# Patient Record
Sex: FEMALE | Race: WHITE
[De-identification: names, ages, dates, MRNs, and addresses within clinical notes are randomized per-mention and may not be internally consistent; named-entity substitution may affect disease eponyms.]

---

## 2017-08-03 NOTE — EDM.PDOC
ED HPI GENERAL MEDICAL PROBLEM





- General


Chief Complaint: Abdominal Pain


Stated Complaint: LLQ pain


Time Seen by Provider: 08/03/17 20:45


Source of Information: Reports: Patient


History Limitations: Reports: No Limitations





- History of Present Illness


INITIAL COMMENTS - FREE TEXT/NARRATIVE: 





states that starting yesterday she started to have left lower quadrant 

abdominal pain.  Has had multiple diarrhea stools since then and had last 

diarrhea about 2 hours ago.  No appetite.  No vomiting.  Has had fever and 

chills throughout the day. Pain is usually sharp and stabbing at about 8/10.  

No pain to the other area of abdomen.  Has history of diverticulitis.  Last 

episode was about 10-15 years ago where she ended up in the hospital for 

something similar.


Onset: Gradual


Duration: Day(s): (2)


Location: Reports: Abdomen


Quality: Reports: Sharp, Stabbing


Severity: Severe


Associated Symptoms: Reports: Fever/Chills, Loss of Appetite


  ** Left Lower Abdomen


Pain Score (Numeric/FACES): 8





- Related Data


 Allergies











Allergy/AdvReac Type Severity Reaction Status Date / Time


 


cortisone Allergy  Abdominal Verified 08/03/17 20:45





   Pain  


 


Penicillins Allergy  Hives Verified 08/03/17 20:45











Home Meds: 


 Home Meds





Cortisol 5 - 10 mg PO BID 08/03/17 [History]


T3 5 - 15 mcg PO DAILY 08/03/17 [History]


T4 75 mcg PO DAILY 08/03/17 [History]











Past Medical History


Gastrointestinal History: Reports: Diverticulosis





- Past Surgical History


HEENT Surgical History: Reports: Cataract Surgery, Tonsillectomy


Female  Surgical History: Reports: Tubal Ligation, Other (See Below) (

lumpectomy)





Social & Family History





- Tobacco Use


Smoking Status *Q: Never Smoker





- Living Situation & Occupation


Living situation: Reports: , with Family


Occupation: Retired





ED ROS GENERAL





- Review of Systems


Review Of Systems: See Below


Constitutional: Reports: Fever, Chills, Decreased Appetite


HEENT: Reports: No Symptoms


Respiratory: Reports: No Symptoms


Cardiovascular: Reports: No Symptoms


GI/Abdominal: Reports: Abdominal Pain, Anorexia, Diarrhea, Nausea.  Denies: 

Hematochezia, Vomiting


: Reports: No Symptoms


Musculoskeletal: Reports: Other ("rotated" her pelvis 2 weeks ago so has been 

having some hip pain.)


Skin: Reports: No Symptoms


Neurological: Reports: No Symptoms


Psychiatric: Reports: No Symptoms





ED EXAM, GI/ABD





- Physical Exam


Exam: See Below


Exam Limited By: No Limitations


General Appearance: Alert, WD/WN, Moderate Distress


Ears: Normal External Exam, Normal Canal, Normal TMs


Nose: Normal Inspection


Throat/Mouth: Normal Inspection, Normal Oropharynx, No Airway Compromise


Head: Atraumatic, Normocephalic


Neck: Normal Inspection, Supple, Non-Tender, Full Range of Motion


Respiratory/Chest: No Respiratory Distress, Lungs Clear, Normal Breath Sounds


Cardiovascular: Normal Peripheral Pulses, Regular Rate, Rhythm, No Edema


GI/Abdominal Exam: Normal Bowel Sounds, Soft, No Distention, Guarding (to left 

lower quadrant.), Tender (to the left lower quadrant.)


Back Exam: Normal Inspection


Extremities: Normal Inspection, Normal Range of Motion, Non-Tender


Neurological: Alert, Oriented


Skin Exam: Warm, Dry, Intact





Course





- Vital Signs


Last Recorded V/S: 


 Last Vital Signs











Temp  100.0 F   08/03/17 20:45


 


Pulse  66   08/03/17 20:45


 


Resp  16   08/03/17 20:45


 


BP  123/69   08/03/17 20:45


 


Pulse Ox  100   08/03/17 20:45














- Orders/Labs/Meds


Orders: 


 Active Orders 24 hr











 Category Date Time Status


 


 Abdomen 2V AP Flat Upright [CR] Stat Exams  08/03/17 20:46 Taken











Labs: 


 Laboratory Tests











  08/03/17 08/03/17 08/03/17 Range/Units





  20:50 20:50 20:55 


 


WBC  13.4 H    (5.0-10.0)  10^3/uL


 


RBC  4.02    (4.00-5.50)  10^6/uL


 


Hgb  13.0    (12.0-16.0)  g/dL


 


Hct  37.9    (37.0-47.0)  %


 


MCV  94.3 H    (82.0-94.0)  fL


 


MCH  32.3 H    (27.0-32.0)  pg


 


MCHC  34.3    (33.0-38.0)  g/dL


 


RDW Coeff of Higinio  11.8    (11.0-15.0)  %


 


Plt Count  229    (150-400)  10^3/uL


 


Neut % (Auto)  83.8    (35-85)  %


 


Lymph % (Auto)  8.4 L    (10-55)  %


 


Mono % (Auto)  7.6    (0-16)  %


 


Eos % (Auto)  0.1    (0-5)  %


 


Baso % (Auto)  0.1    (0-3)  %


 


Neut # (Auto)  11.25 H    (1.80-7.00)  10^3/uL


 


Lymph # (Auto)  1.12    (1.00-4.80)  10^3/uL


 


Mono # (Auto)  1.02 H    (0.00-0.80)  10^3/uL


 


Eos # (Auto)  0.01    (0.00-0.45)  10^3/uL


 


Baso # (Auto)  0.01    10^3/uL


 


Sodium   137   (136-145)  mEq/L


 


Potassium   3.8   (3.5-5.0)  mEq/L


 


Chloride   100   ()  mEq/L


 


Carbon Dioxide   26   (21-32)  mmol/L


 


BUN   10   (7-18)  mg/dL


 


Creatinine   0.7   (0.6-1.0)  mg/dL


 


Est Cr Clr Drug Dosing   64.99   mL/min


 


Estimated GFR (MDRD)   > 60   (>=60)  mL/min


 


Glucose   102 H   (75-99)  mg/dL


 


Calcium   9.6   (8.4-10.1)  mg/dL


 


C-Reactive Protein   3.9 H   (0.2-0.8)  mg/dL


 


Urine Color    Yellow  (YELLOW)  


 


Urine Appearance    Clear  (CLEAR)  


 


Urine pH    8.0  (4.5-8.0)  


 


Ur Specific Gravity    1.012  (1.003-1.020)  


 


Urine Protein    Negative  (NEGATIVE)  mg/dL


 


Urine Glucose (UA)    Negative  (NEGATIVE)  mg/dL


 


Urine Ketones    40 H  (NEGATIVE)  mg/dL


 


Urine Occult Blood    Moderate H  (NEGATIVE)  


 


Urine Nitrite    Negative  (NEGATIVE)  


 


Urine Bilirubin    Negative  (NEGATIVE)  


 


Urine Urobilinogen    0.2  (0.2-1.0)  EU/dL


 


Ur Leukocyte Esterase    Trace H  (NEGATIVE)  


 


Urine RBC    5-10 H  (0-5)  /HPF


 


Urine WBC    0-5  (0-5)  /HPF


 


Amorphous Sediment    Few H  (NOT SEEN)  /HPF














- Re-Assessments/Exams


Free Text/Narrative Re-Assessment/Exam: 





08/03/17 21:23


In to discuss lab results.  Will admit for IV fluids and antibiotics.





Departure





- Departure


Time of Disposition: 21:24


Disposition: Admitted As Inpatient 66


Condition: Good


Clinical Impression: 


 Diverticulitis large intestine








- Discharge Information





- Problem List & Annotations


(1) Diverticulitis large intestine


SNOMED Code(s): 3094438


   Code(s): K57.32 - DVTRCLI OF LG INT W/O PERFORATION OR ABSCESS W/O BLEEDING 

  Status: Acute   Priority: High   Current Visit: Yes   


Qualifiers: 


   Diverticulitis bleeding: without bleeding   Diverticulitis complication: 

without perforation or abscess   Qualified Code(s): K57.32 - Diverticulitis of 

large intestine without perforation or abscess without bleeding   





- My Orders


Last 24 Hours: 


My Active Orders





08/03/17 20:46


Abdomen 2V AP Flat Upright [CR] Stat 














- Assessment/Plan


Admission H&P: Please use this note as an admission H&P


Last 24 Hours: 


My Active Orders





08/03/17 20:46


Abdomen 2V AP Flat Upright [CR] Stat 











Plan: 





will admit to acute care to Dr. Dahl with IV fluids and antibiotics.  Repeat 

labs in the AM.

## 2017-08-04 NOTE — PCM.PN
- General Info


Date of Service: 08/04/17


Functional Status: Reports: Pain Controlled, Tolerating Diet, Ambulating





- Review of Systems


General: Denies: Fever, Weakness, Fatigue


HEENT: Reports: No Symptoms


Pulmonary: Reports: No Symptoms


Cardiovascular: Reports: No Symptoms


Gastrointestinal: Reports: Abdominal Pain, Diarrhea.  Denies: Nausea, Vomiting


Genitourinary: Reports: No Symptoms


Musculoskeletal: Reports: No Symptoms


Skin: Reports: No Symptoms


Neurological: Reports: No Symptoms





- Patient Data


Vitals - Most Recent: 


 Last Vital Signs











Temp  98.8 F   08/04/17 20:00


 


Pulse  64   08/04/17 20:00


 


Resp  16   08/04/17 20:00


 


BP  111/57 L  08/04/17 20:00


 


Pulse Ox  99   08/04/17 20:00











Weight - Most Recent: 119 lb 12.8 oz


I&O - Last 24 Hours: 


 Intake & Output











 08/04/17 08/04/17 08/04/17





 06:59 14:59 22:59


 


Intake Total 250 1445 1200


 


Output Total 400 900 850


 


Balance -150 545 350











Lab Results Last 24 Hours: 


 Laboratory Results - last 24 hr











  08/04/17 08/04/17 Range/Units





  07:00 07:00 


 


WBC  13.1 H   (5.0-10.0)  10^3/uL


 


RBC  3.72 L   (4.00-5.50)  10^6/uL


 


Hgb  11.7 L   (12.0-16.0)  g/dL


 


Hct  35.1 L   (37.0-47.0)  %


 


MCV  94.4 H   (82.0-94.0)  fL


 


MCH  31.5   (27.0-32.0)  pg


 


MCHC  33.3   (33.0-38.0)  g/dL


 


RDW Coeff of Higinio  11.7   (11.0-15.0)  %


 


Plt Count  220   (150-400)  10^3/uL


 


Neut % (Auto)  84.9   (35-85)  %


 


Lymph % (Auto)  6.1 L   (10-55)  %


 


Mono % (Auto)  8.9   (0-16)  %


 


Eos % (Auto)  0   (0-5)  %


 


Baso % (Auto)  0.1   (0-3)  %


 


Neut # (Auto)  11.10 H   (1.80-7.00)  10^3/uL


 


Lymph # (Auto)  0.80 L   (1.00-4.80)  10^3/uL


 


Mono # (Auto)  1.17 H   (0.00-0.80)  10^3/uL


 


Eos # (Auto)  0.00   (0.00-0.45)  10^3/uL


 


Baso # (Auto)  0.01   10^3/uL


 


C-Reactive Protein   7.9 H  (0.2-0.8)  mg/dL











Esteban Results Last 24 Hours: 


 Microbiology











 08/04/17 03:00 Stool for WBCs - Final





 Stool / Feces    NO WBC SEEN











Med Orders - Current: 


 Current Medications





Acetaminophen (Tylenol)  650 mg PO Q4H PRN


   PRN Reason: Pain (Mild 1-3)/fever


Enoxaparin Sodium (Lovenox)  40 mg SUBCUT BEDTIME [facility]


   Last Admin: 08/04/17 20:16 Dose:  Not Given


Lactated Ringer's (Ringers, Lactated)  1,000 mls @ 100 mls/hr IV ASDIRECTED [facility]


   Last Admin: 08/04/17 12:03 Dose:  100 mls/hr


Metronidazole 500 mg/ Premix  100 mls @ 100 mls/hr IV Q8H [facility]


   Last Admin: 08/04/17 15:33 Dose:  100 mls/hr


Levofloxacin/Dextrose 500 mg/ (Premix)  100 mls @ 100 mls/hr IV 2000 [facility]


   Last Admin: 08/04/17 20:18 Dose:  100 mls/hr


Ondansetron HCl (Zofran Odt)  4 mg PO Q4H PRN


   PRN Reason: nausea, able to take PO


Sodium Chloride (Saline Flush)  10 ml FLUSH ASDIRECTED PRN


   PRN Reason: Keep Vein Open





Discontinued Medications





Levofloxacin/Dextrose 500 mg/ (Premix)  100 mls @ 100 mls/hr IV Q24H [facility]


   Last Admin: 08/03/17 22:04 Dose:  Not Given


Metronidazole 500 mg/ Premix  100 mls @ 100 mls/hr IV Q8H [facility]


   Last Admin: 08/04/17 07:34 Dose:  100 mls/hr


Levofloxacin/Dextrose 500 mg/ (Premix)  100 mls @ 100 mls/hr IV BEDTIME [facility]


   Last Admin: 08/03/17 22:06 Dose:  100 mls/hr











- Exam


General: Alert, Oriented


HEENT: Mucous Membr. Moist/Pink


Neck: Supple


Lungs: Clear to Auscultation, Normal Respiratory Effort


Cardiovascular: Regular Rate, Regular Rhythm


GI/Abdominal Exam: Normal Bowel Sounds, Soft, Tender (LLQ)


Back Exam: Normal Inspection, Full Range of Motion


Extremities: Normal Inspection, No Pedal Edema


Skin: Warm, Dry


Neurological: No New Focal Deficit


Psy/Mental Status: Alert, Normal Affect, Normal Mood





- Problem List & Annotations


(1) Diverticulitis large intestine


SNOMED Code(s): 2871468


   Code(s): K57.32 - DVTRCLI OF LG INT W/O PERFORATION OR ABSCESS W/O BLEEDING 

  Status: Acute   Priority: High   Current Visit: Yes   


Qualifiers: 


   Diverticulitis bleeding: without bleeding   Diverticulitis complication: 

without perforation or abscess   Qualified Code(s): K57.32 - Diverticulitis of 

large intestine without perforation or abscess without bleeding   





- Problem List Review


Problem List Initiated/Reviewed/Updated: Yes





- Assessment


Assessment:: 





Diverticulitis








- Plan


Plan:: 





Patient is feeling better this am.  States still has left lower quadrant 

discomfort but better,  Not requesting pain meds.  No nausea.  No diarrhea this 

am.  Afebrile.  Tolerating meal this am.  WBC slightly improved to 13.1, CRP 

increased to 7.9.  





Will continue diverticular diet.  IV fluids.  Levaquin and Flagyl IV continued.

  Reevaluate potential for discharge in am.

## 2017-08-05 NOTE — PCM.DCSUM1
**Discharge Summary





- Discharge Data


Discharge Date: 08/05/17


Discharge Disposition: Home, Self-Care 01


Condition: Good





- Patient Instructions


Diet: GI Soft/Low Residue/Low Fiber


Diet, Other: Advance as Tolerated


Activity: Rest and Relax Today


Driving: May Drive Today


Showering/Bathing: May Shower


Notify Provider of: Fever, Increased Pain, Nausea and/or Vomiting





- Discharge Plan


Home Medications: 


 Home Meds





Cortisol 5 - 10 mg PO BID 08/03/17 [History]


T3 5 - 15 mcg PO DAILY 08/03/17 [History]


T4 75 mcg PO DAILY 08/03/17 [History]








Forms:  ED Department Discharge


Referrals: 


PCP,None [Primary Care Provider] - 





- Discharge Summary/Plan Comment


DC Time >30 min.: No





- General Info


Functional Status: Reports: Pain Controlled, Tolerating Diet, Ambulating, 

Urinating.  Denies: New Symptoms





- Review of Systems


General: Reports: No Symptoms, Appetite.  Denies: Fever, Weakness, Fatigue


HEENT: Reports: No Symptoms


Pulmonary: Reports: No Symptoms


Cardiovascular: Reports: No Symptoms


Gastrointestinal: Reports: No Symptoms, Flatus.  Denies: Hematochezia, Melena, 

Nausea, Vomiting


Genitourinary: Reports: No Symptoms


Musculoskeletal: Reports: No Symptoms


Skin: Reports: No Symptoms


Neurological: Reports: No Symptoms


Psychiatric: Reports: No Symptoms





- Patient Data


Vitals - Most Recent: 


 Last Vital Signs











Temp  36.4 C   08/05/17 08:00


 


Pulse  51 L  08/05/17 08:00


 


Resp  16   08/05/17 08:00


 


BP  150/68 H  08/05/17 08:00


 


Pulse Ox  95   08/05/17 08:00











Weight - Most Recent: 54.34 kg


I&O - Last 24 hours: 


 Intake & Output











 08/04/17 08/05/17 08/05/17





 22:59 06:59 14:59


 


Intake Total 2300 300 


 


Output Total 850 500 


 


Balance 1450 -200 











Lab Results - Last 24 hrs: 


 Laboratory Results - last 24 hr











  08/05/17 08/05/17 Range/Units





  05:11 05:11 


 


WBC  6.6   (5.0-10.0)  10^3/uL


 


RBC  3.51 L   (4.00-5.50)  10^6/uL


 


Hgb  11.2 L   (12.0-16.0)  g/dL


 


Hct  33.7 L   (37.0-47.0)  %


 


MCV  96.0 H   (82.0-94.0)  fL


 


MCH  31.9   (27.0-32.0)  pg


 


MCHC  33.2   (33.0-38.0)  g/dL


 


RDW Coeff of Higinio  12.0   (11.0-15.0)  %


 


Plt Count  201   (150-400)  10^3/uL


 


Neut % (Auto)  68.6   (35-85)  %


 


Lymph % (Auto)  17.9   (10-55)  %


 


Mono % (Auto)  12.5   (0-16)  %


 


Eos % (Auto)  0.8   (0-5)  %


 


Baso % (Auto)  0.2   (0-3)  %


 


Neut # (Auto)  4.56   (1.80-7.00)  10^3/uL


 


Lymph # (Auto)  1.19   (1.00-4.80)  10^3/uL


 


Mono # (Auto)  0.83 H   (0.00-0.80)  10^3/uL


 


Eos # (Auto)  0.05   (0.00-0.45)  10^3/uL


 


Baso # (Auto)  0.01   10^3/uL


 


Sodium   142  (136-145)  mEq/L


 


Potassium   3.7  (3.5-5.0)  mEq/L


 


Chloride   107 H  ()  mEq/L


 


Carbon Dioxide   25  (21-32)  mmol/L


 


BUN   9  (7-18)  mg/dL


 


Creatinine   0.7  (0.6-1.0)  mg/dL


 


Est Cr Clr Drug Dosing   65.98  mL/min


 


Estimated GFR (MDRD)   > 60  (>=60)  mL/min


 


Glucose   95  (75-99)  mg/dL


 


Calcium   8.8  (8.4-10.1)  mg/dL


 


C-Reactive Protein   11.1 H  (0.2-0.8)  mg/dL











BOB Results - Last 24 hrs: 


 Microbiology











 08/04/17 03:00 Stool for WBCs - Final





 Stool / Feces    NO WBC SEEN











Med Orders - Current: 


 Current Medications





Acetaminophen (Tylenol)  650 mg PO Q4H PRN


   PRN Reason: Pain (Mild 1-3)/fever


Enoxaparin Sodium (Lovenox)  40 mg SUBCUT BEDTIME [facility]


   Last Admin: 08/04/17 20:16 Dose:  Not Given


Lactated Ringer's (Ringers, Lactated)  1,000 mls @ 100 mls/hr IV ASDIRECTED [facility]


   Last Admin: 08/05/17 00:37 Dose:  100 mls/hr


Metronidazole 500 mg/ Premix  100 mls @ 100 mls/hr IV Q8H [facility]


   Last Admin: 08/05/17 07:59 Dose:  100 mls/hr


Levofloxacin/Dextrose 500 mg/ (Premix)  100 mls @ 100 mls/hr IV 2000 [facility]


   Last Admin: 08/04/17 20:18 Dose:  100 mls/hr


Ondansetron HCl (Zofran Odt)  4 mg PO Q4H PRN


   PRN Reason: nausea, able to take PO


Sodium Chloride (Saline Flush)  10 ml FLUSH ASDIRECTED PRN


   PRN Reason: Keep Vein Open





Discontinued Medications





Levofloxacin/Dextrose 500 mg/ (Premix)  100 mls @ 100 mls/hr IV Q24H [facility]


   Last Admin: 08/03/17 22:04 Dose:  Not Given


Metronidazole 500 mg/ Premix  100 mls @ 100 mls/hr IV Q8H [facility]


   Last Admin: 08/04/17 07:34 Dose:  100 mls/hr


Levofloxacin/Dextrose 500 mg/ (Premix)  100 mls @ 100 mls/hr IV BEDTIME [facility]


   Last Admin: 08/03/17 22:06 Dose:  100 mls/hr











- Exam


General: Reports: Alert, Oriented


HEENT: Reports: Pupils Equal, Pupils Reactive


Neck: Reports: Supple


Lungs: Reports: Clear to Auscultation, Normal Respiratory Effort


Cardiovascular: Reports: Regular Rate, Regular Rhythm


GI/Abdominal Exam: Normal Bowel Sounds, Soft, Non-Tender, No Organomegaly, No 

Distention


 (Female) Exam: Deferred


Rectal (Female) Exam: Deferred


Back Exam: Reports: Normal Inspection


Extremities: Normal Inspection


Skin: Reports: Warm, Dry, Intact


Neurological: Reports: No New Focal Deficit


Psy/Mental Status: Reports: Alert, Normal Affect, Normal Mood





*Q Meaningful Use (DIS)





- VTE *Q


VTE Criteria *Q: 








- Stroke *Q


Stroke Criteria *Q: 








- AMI *Q


AMI Criteria *Q:

## 2017-08-05 NOTE — PCM.PN
- General Info


Date of Service: 08/05/17


Admission Dx/Problem (Free Text): 





Acute Diverticulitis


Functional Status: Reports: Pain Controlled, Tolerating Diet, Ambulating, 

Urinating.  Denies: New Symptoms





- Review of Systems


General: Reports: No Symptoms.  Denies: Fever, Weakness, Fatigue


HEENT: Reports: No Symptoms


Pulmonary: Reports: No Symptoms


Cardiovascular: Reports: No Symptoms


Gastrointestinal: Reports: Flatus.  Denies: Abdominal Pain, Hematochezia, Melena

, Nausea, Vomiting


Genitourinary: Reports: No Symptoms


Musculoskeletal: Reports: No Symptoms


Skin: Reports: No Symptoms


Neurological: Reports: No Symptoms


Psychiatric: Reports: No Symptoms





- Patient Data


Vitals - Most Recent: 


 Last Vital Signs











Temp  36.4 C   08/05/17 08:00


 


Pulse  51 L  08/05/17 08:00


 


Resp  16   08/05/17 08:00


 


BP  150/68 H  08/05/17 08:00


 


Pulse Ox  95   08/05/17 08:00











Weight - Most Recent: 54.34 kg


I&O - Last 24 Hours: 


 Intake & Output











 08/04/17 08/05/17 08/05/17





 22:59 06:59 14:59


 


Intake Total 2300 300 


 


Output Total 850 500 


 


Balance 1450 -200 











Lab Results Last 24 Hours: 


 Laboratory Results - last 24 hr











  08/05/17 08/05/17 Range/Units





  05:11 05:11 


 


WBC  6.6   (5.0-10.0)  10^3/uL


 


RBC  3.51 L   (4.00-5.50)  10^6/uL


 


Hgb  11.2 L   (12.0-16.0)  g/dL


 


Hct  33.7 L   (37.0-47.0)  %


 


MCV  96.0 H   (82.0-94.0)  fL


 


MCH  31.9   (27.0-32.0)  pg


 


MCHC  33.2   (33.0-38.0)  g/dL


 


RDW Coeff of Higinio  12.0   (11.0-15.0)  %


 


Plt Count  201   (150-400)  10^3/uL


 


Neut % (Auto)  68.6   (35-85)  %


 


Lymph % (Auto)  17.9   (10-55)  %


 


Mono % (Auto)  12.5   (0-16)  %


 


Eos % (Auto)  0.8   (0-5)  %


 


Baso % (Auto)  0.2   (0-3)  %


 


Neut # (Auto)  4.56   (1.80-7.00)  10^3/uL


 


Lymph # (Auto)  1.19   (1.00-4.80)  10^3/uL


 


Mono # (Auto)  0.83 H   (0.00-0.80)  10^3/uL


 


Eos # (Auto)  0.05   (0.00-0.45)  10^3/uL


 


Baso # (Auto)  0.01   10^3/uL


 


Sodium   142  (136-145)  mEq/L


 


Potassium   3.7  (3.5-5.0)  mEq/L


 


Chloride   107 H  ()  mEq/L


 


Carbon Dioxide   25  (21-32)  mmol/L


 


BUN   9  (7-18)  mg/dL


 


Creatinine   0.7  (0.6-1.0)  mg/dL


 


Est Cr Clr Drug Dosing   65.98  mL/min


 


Estimated GFR (MDRD)   > 60  (>=60)  mL/min


 


Glucose   95  (75-99)  mg/dL


 


Calcium   8.8  (8.4-10.1)  mg/dL


 


C-Reactive Protein   11.1 H  (0.2-0.8)  mg/dL











Esteban Results Last 24 Hours: 


 Microbiology











 08/04/17 03:00 Stool for WBCs - Final





 Stool / Feces    NO WBC SEEN











Med Orders - Current: 


 Current Medications





Acetaminophen (Tylenol)  650 mg PO Q4H PRN


   PRN Reason: Pain (Mild 1-3)/fever


Enoxaparin Sodium (Lovenox)  40 mg SUBCUT BEDTIME [facility]


   Last Admin: 08/04/17 20:16 Dose:  Not Given


Lactated Ringer's (Ringers, Lactated)  1,000 mls @ 100 mls/hr IV ASDIRECTED [facility]


   Last Admin: 08/05/17 00:37 Dose:  100 mls/hr


Metronidazole 500 mg/ Premix  100 mls @ 100 mls/hr IV Q8H [facility]


   Last Admin: 08/05/17 07:59 Dose:  100 mls/hr


Levofloxacin/Dextrose 500 mg/ (Premix)  100 mls @ 100 mls/hr IV 2000 [facility]


   Last Admin: 08/04/17 20:18 Dose:  100 mls/hr


Ondansetron HCl (Zofran Odt)  4 mg PO Q4H PRN


   PRN Reason: nausea, able to take PO


Sodium Chloride (Saline Flush)  10 ml FLUSH ASDIRECTED PRN


   PRN Reason: Keep Vein Open





Discontinued Medications





Levofloxacin/Dextrose 500 mg/ (Premix)  100 mls @ 100 mls/hr IV Q24H [facility]


   Last Admin: 08/03/17 22:04 Dose:  Not Given


Metronidazole 500 mg/ Premix  100 mls @ 100 mls/hr IV Q8H [facility]


   Last Admin: 08/04/17 07:34 Dose:  100 mls/hr


Levofloxacin/Dextrose 500 mg/ (Premix)  100 mls @ 100 mls/hr IV BEDTIME [facility]


   Last Admin: 08/03/17 22:06 Dose:  100 mls/hr











- Exam


Quality Assessment: DVT Prophylaxis.  No: Skin Breakdown


General: Alert, Oriented, Cooperative, No Acute Distress


HEENT: Pupils Equal, Pupils Reactive, EOMI


Neck: Supple


Lungs: Clear to Auscultation, Normal Respiratory Effort


Cardiovascular: Regular Rate, Regular Rhythm


GI/Abdominal Exam: Normal Bowel Sounds, Soft, Non-Tender, No Organomegaly, No 

Distention


Back Exam: Normal Inspection, Full Range of Motion


Extremities: Normal Inspection, Normal Range of Motion, No Pedal Edema, Normal 

Capillary Refill.  No: Ino's Sign


Peripheral Pulses: 2+: Radial (L), Radial (R), Femoral (L), Femoral (R), 

Posterior Tibial (L), Posterior Tibial (R), Dorsalis Pedis (L), Dorsalis Pedis (

R)


Skin: Warm, Dry, Intact


Neurological: No New Focal Deficit


Psy/Mental Status: Alert, Normal Affect, Normal Mood (Requesting discharge to 

home)





- Problem List Review


Problem List Initiated/Reviewed/Updated: Yes





- Assessment


Assessment:: 





Diverticulitis


08-: Resolving diverticulitis


Prepare for discharge this am.





- Plan


Plan:: 





Patient is feeling better this am.  States still has left lower quadrant 

discomfort but better,  Not requesting pain meds.  No nausea.  No diarrhea this 

am.  Afebrile.  Tolerating meal this am.  WBC slightly improved to 13.1, CRP 

increased to 7.9.  





Will continue diverticular diet.  IV fluids.  Levaquin and Flagyl IV continued.

  Reevaluate potential for discharge in am.





08-: Requesting discharge. Feels better this am. Resolved LLQ discomfort

, no pain medications needed. Denies N/V/D. Afebrile and tolerating diet. WBC 

WNL-6.6 CRP elevation 11.1. Discharge to home on po Levoquin 500 mg daily and 

Flagyl 500 mg TID. F/U with PCP on Tuesday for repeat CBC and CRP, or 

reoccurance of S/S. Discussed probable repeat of Colonoscopy, dietary 

modification with gradual increase of fiber and increased hydration.

## 2019-02-22 NOTE — OR
DATE OF OPERATION:  02/22/2019

 

PREOPERATIVE DIAGNOSIS:  HISTORY OF COLON CANCER.

 

POSTOPERATIVE DIAGNOSIS:  HISTORY OF COLON CANCER.

 

SURGEON:  Collin Dahl MD

 

PROCEDURE:  FULL-LENGTH COLONOSCOPY.

 

ANESTHESIA:  CRNA.

 

COMPLICATIONS:  None.

 

SPECIMEN:  None.

 

FINDINGS:

1. Full-length colonoscopy.

2. Pandiverticulosis, moderate to severe.

 

RECOMMENDATIONS:  Routine colonoscopy every 5 years at this point.

 

INDICATIONS:  The patient has a distant history of colon cancer  apparently 35

years ago.  She has progressed to scopes every 5 years according to Oncology.

 

DESCRIPTION OF PROCEDURE:  The patient was prepped and draped, placed in a left

lateral decubitus position.  A lubricated Olympus colonoscope was inserted and

with relative ease advanced to the cecum.  She was quite tortuous in the sigmoid

and when we did pass through there, she did summer down.  We had to stop

advancing and her pulse rebounded within 30 seconds and stayed above 60 the rest

of the procedure. We _________ the end. We were directly  able to visualize the

ileocecal valve and appendiceal orifice.  The bowel prep was excellent.  Upon

withdrawal of the scope, the patient has quite severe pandiverticular disease,

worse in the left colon as expected, but pretty heavy throughout.  No signs of

any active colitis.  Throughout the entire colon, I could find no polyps, mass,

ulceration, or bleeding sites.  No vascular abnormalities or signs of any colon

cancer recurrence.  The rectal vault was benign.

Retroflexion of the scope in the rectum showed no perianal lesions.  Air was

suctioned, scope removed without complication.

 

MIHAI/ALLAN

DD:  02/22/2019 08:33:53

DT:  02/22/2019 11:03:55

Job #:  [id number]

## 2023-01-14 ENCOUNTER — HOSPITAL ENCOUNTER (EMERGENCY)
Dept: HOSPITAL 38 - CC.ED | Age: 73
Discharge: HOME | End: 2023-01-14
Payer: MEDICARE

## 2023-01-14 VITALS — SYSTOLIC BLOOD PRESSURE: 115 MMHG | DIASTOLIC BLOOD PRESSURE: 73 MMHG | HEART RATE: 80 BPM

## 2023-01-14 DIAGNOSIS — Z88.0: ICD-10-CM

## 2023-01-14 DIAGNOSIS — Z20.822: ICD-10-CM

## 2023-01-14 DIAGNOSIS — Z88.5: ICD-10-CM

## 2023-01-14 DIAGNOSIS — Z88.8: ICD-10-CM

## 2023-01-14 DIAGNOSIS — G43.109: Primary | ICD-10-CM

## 2023-01-14 LAB
CHLORIDE SERPL-SCNC: 102 MEQ/L (ref 98–106)
EGFRCR SERPLBLD CKD-EPI 2021: 68 ML/MIN (ref 60–?)
SODIUM SERPL-SCNC: 137 MEQ/L (ref 136–145)

## 2023-01-14 PROCEDURE — 36415 COLL VENOUS BLD VENIPUNCTURE: CPT

## 2023-01-14 PROCEDURE — 96361 HYDRATE IV INFUSION ADD-ON: CPT

## 2023-01-14 PROCEDURE — 70450 CT HEAD/BRAIN W/O DYE: CPT

## 2023-01-14 PROCEDURE — 71046 X-RAY EXAM CHEST 2 VIEWS: CPT

## 2023-01-14 PROCEDURE — 80053 COMPREHEN METABOLIC PANEL: CPT

## 2023-01-14 PROCEDURE — 85025 COMPLETE CBC W/AUTO DIFF WBC: CPT

## 2023-01-14 PROCEDURE — 83735 ASSAY OF MAGNESIUM: CPT

## 2023-01-14 PROCEDURE — 93005 ELECTROCARDIOGRAM TRACING: CPT

## 2023-01-14 PROCEDURE — 99284 EMERGENCY DEPT VISIT MOD MDM: CPT

## 2023-01-14 PROCEDURE — 84484 ASSAY OF TROPONIN QUANT: CPT

## 2023-01-14 PROCEDURE — 96374 THER/PROPH/DIAG INJ IV PUSH: CPT

## 2023-01-14 PROCEDURE — U0002 COVID-19 LAB TEST NON-CDC: HCPCS
